# Patient Record
Sex: MALE | Race: WHITE | URBAN - METROPOLITAN AREA
[De-identification: names, ages, dates, MRNs, and addresses within clinical notes are randomized per-mention and may not be internally consistent; named-entity substitution may affect disease eponyms.]

---

## 2019-01-01 ENCOUNTER — OFFICE VISIT (OUTPATIENT)
Dept: NEUROLOGY | Age: 79
End: 2019-01-01

## 2019-01-01 VITALS
BODY MASS INDEX: 22.66 KG/M2 | WEIGHT: 153 LBS | HEART RATE: 60 BPM | DIASTOLIC BLOOD PRESSURE: 55 MMHG | OXYGEN SATURATION: 98 % | SYSTOLIC BLOOD PRESSURE: 98 MMHG | HEIGHT: 69 IN

## 2019-01-01 DIAGNOSIS — I10 BENIGN ESSENTIAL HTN: ICD-10-CM

## 2019-01-01 DIAGNOSIS — I63.9 CEREBROVASCULAR ACCIDENT (CVA), UNSPECIFIED MECHANISM (HCC): ICD-10-CM

## 2019-01-01 DIAGNOSIS — G43.009 MIGRAINE WITHOUT AURA AND WITHOUT STATUS MIGRAINOSUS, NOT INTRACTABLE: Primary | ICD-10-CM

## 2019-01-01 DIAGNOSIS — E78.5 HYPERLIPIDEMIA, UNSPECIFIED HYPERLIPIDEMIA TYPE: ICD-10-CM

## 2019-01-01 RX ORDER — ALPRAZOLAM 0.5 MG/1
0.5 TABLET ORAL
COMMUNITY

## 2019-01-01 RX ORDER — SUCRALFATE 1 G/10ML
SUSPENSION ORAL 4 TIMES DAILY
COMMUNITY

## 2019-01-01 RX ORDER — PYRIDOXINE HCL (VITAMIN B6) 25 MG
25 TABLET ORAL DAILY
COMMUNITY

## 2019-01-01 RX ORDER — ASCORBIC ACID 500 MG
TABLET ORAL
COMMUNITY

## 2019-01-01 RX ORDER — LATANOPROST 50 UG/ML
1 SOLUTION/ DROPS OPHTHALMIC
COMMUNITY

## 2019-01-01 RX ORDER — CLOPIDOGREL BISULFATE 75 MG/1
TABLET ORAL
COMMUNITY

## 2019-01-01 RX ORDER — SPIRONOLACTONE 25 MG/1
TABLET ORAL DAILY
COMMUNITY

## 2019-01-01 RX ORDER — POTASSIUM CHLORIDE 1.5 G/1.77G
20 POWDER, FOR SOLUTION ORAL 2 TIMES DAILY WITH MEALS
COMMUNITY

## 2019-01-01 RX ORDER — LISINOPRIL 5 MG/1
TABLET ORAL DAILY
COMMUNITY

## 2019-01-01 RX ORDER — ESCITALOPRAM OXALATE 10 MG/1
10 TABLET ORAL DAILY
COMMUNITY

## 2019-01-01 RX ORDER — ATORVASTATIN CALCIUM 40 MG/1
TABLET, FILM COATED ORAL DAILY
COMMUNITY

## 2019-01-01 RX ORDER — BUMETANIDE 1 MG/1
TABLET ORAL DAILY
COMMUNITY

## 2019-01-01 RX ORDER — SUMATRIPTAN 25 MG/1
TABLET, FILM COATED ORAL
Qty: 9 TAB | Refills: 2 | Status: SHIPPED | OUTPATIENT
Start: 2019-01-01

## 2019-01-01 RX ORDER — FOLIC ACID 1 MG/1
TABLET ORAL DAILY
COMMUNITY

## 2019-01-01 RX ORDER — DIVALPROEX SODIUM 250 MG/1
250 TABLET, DELAYED RELEASE ORAL
Qty: 30 TAB | Refills: 2 | Status: SHIPPED | OUTPATIENT
Start: 2019-01-01

## 2019-01-01 RX ORDER — ASPIRIN 81 MG/1
TABLET ORAL DAILY
COMMUNITY

## 2019-09-24 NOTE — LETTER
9/24/19 Patient: Cristina Brar YOB: 1940 Date of Visit: 9/24/2019 [address]by [address] [place] [address] VIA Facsimile: [phone] Dear Mahesh Obando MD, Thank you for referring Mr. Randy Melendez to [address] [place] [address] for evaluation. My notes for this consultation are attached. If you have questions, please do not hesitate to call me. I look forward to following your patient along with you. Sincerely, Kerry Mcghee MD

## 2019-09-24 NOTE — PATIENT INSTRUCTIONS

## 2019-09-24 NOTE — PROGRESS NOTES
Neurology Note    Chief Complaint   Patient presents with    New Patient     cva memory       HPI/Subjective  Vinay Ita ODEN is a 70 y.o. male who presented to the neurology office for management of stroke. In June 2019, he was confused and dizzy and he did have CT head and he did have a subacute right occipital infarct. The memory problem has improved some. He takes aspirin 81 mg a day and plavix 75 a day. He does also have headaches 1/week. It is in the right occipital area. It is 8/10 and it is a dull ache in nature. There are not associated with photophobia, phonophobia, nausea and vomiting. 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 Current Outpatient Medications   Medication Sig    aspirin delayed-release 81 mg tablet Take  by mouth daily.  bumetanide (BUMEX) 1 mg tablet Take  by mouth daily.  multivitamin, tx-iron-ca-min (THERA-M W/ IRON) 9 mg iron-400 mcg tab tablet Take 1 Tab by mouth daily.  clopidogrel (PLAVIX) 75 mg tab Take  by mouth.  pyridoxine, vitamin B6, (VITAMIN B-6) 25 mg tablet Take 25 mg by mouth daily.  escitalopram oxalate (LEXAPRO) 10 mg tablet Take 10 mg by mouth daily.  folic acid (FOLVITE) 1 mg tablet Take  by mouth daily.  lisinopril (PRINIVIL, ZESTRIL) 5 mg tablet Take  by mouth daily.  spironolactone (ALDACTONE) 25 mg tablet Take  by mouth daily.  ascorbic acid, vitamin C, (VITAMIN C) 500 mg tablet Take  by mouth.  ALPRAZolam (XANAX) 0.5 mg tablet Take 0.5 mg by mouth nightly as needed for Anxiety.  atorvastatin (LIPITOR) 40 mg tablet Take  by mouth daily.  sucralfate (CARAFATE) 100 mg/mL suspension Take  by mouth four (4) times daily.  latanoprost (XALATAN) 0.005 % ophthalmic solution Administer 1 Drop to both eyes nightly.  potassium chloride (KLOR-CON) 20 mEq pack Take 20 mEq by mouth two (2) times daily (with meals).     SUMAtriptan (IMITREX) 25 mg tablet 1 tab PO at onset of headache, can repeat X 1 in 2 hrs if HA persists. Not more than 10 days/month.  divalproex DR (DEPAKOTE) 250 mg tablet Take 1 Tab by mouth nightly. No current facility-administered medications for this visit. Allergies not on file  Past Medical History:   Diagnosis Date    Cerebral artery occlusion with cerebral infarction [facility])     Essential hypertension     Heart disease      No past surgical history on file. No family history on file. Social History     Tobacco Use    Smoking status: Not on file   Substance Use Topics    Alcohol use: Not on file    Drug use: Not on file       REVIEW OF SYSTEMS:   A ten system review of constitutional, cardiovascular, respiratory, musculoskeletal, endocrine, skin, SHEENT, genitourinary, psychiatric and neurologic systems was obtained and is unremarkable with the exception of dizziness    EXAMINATION:   There were no vitals taken for this visit. General:   General appearance: Pt is in no acute distress   Distal pulses are preserved    Neurological Examination:   Mental Status: AAO x3. Speech is fluent. Follows commands, has normal fund of knowledge, attention, short term recall, comprehension and insight. Cranial Nerves: Visual fields are full. PERRL, Extraocular movements are full. Facial sensation intact. Facial movement intact. Hearing intact to conversation. Palate elevates symmetrically. Shoulder shrug symmetric. Tongue midline. Motor: Strength is 5/5 in all 4 ext. No atrophy. Tone: Normal    Sensation: Light touch - Normal    Reflexes: DTRs 2+ throughout. Coordination/Cerebellar: Intact to finger-nose-finger     Gait: Casual gait is normal.     Skin: No significant bruising or lacerations. Laboratory review:   No results found for this or any previous visit. Imaging review:  6/26/2019  CT scan of the head without contrast  No acute intracranial hemorrhage. Subacute to chronic appearing right occipital lobe infarct.   Small chronic appearing left occipital infarct. Nonspecific punctate scattered intracranial calcification. 1/11/2019  LDL 65    Documentation review:  None    Assessment/Plan:   Lyn Gutierrez III is a 70 y.o. male who presented to the neurology office for management of stroke. The patient has had 2 CT scans in the recent one did show a right occipital infarction. Patient is taking aspirin 81 mg a day and Plavix 75 mg daily. We will continue the same. Other risk factors include hyperlipidemia the patient is taking Lipitor. Patient is also have left carotid occlusion. I am going to get the records from the primary care physician and the cardiologist.  If there is left carotid occlusion, nothing can be done about it. The patient is also having migraines which is almost every day. We will start the patient on Imitrex as needed and Depakote 250 mg daily. Follow-up in 3 months    No flowsheet data found. Primary care to address possible depression if PHQ-9 score is more than 9. ICD-10-CM ICD-9-CM    1. Migraine without aura and without status migrainosus, not intractable G43.009 346.10 SUMAtriptan (IMITREX) 25 mg tablet      divalproex DR (DEPAKOTE) 250 mg tablet   2. Cerebrovascular accident (CVA), unspecified mechanism ([facility] [address] I63.9 434.91    3. Hyperlipidemia, unspecified hyperlipidemia type E78.5 272.4    4. Benign essential HTN I10 401.1       Thank you for allowing me to participate in the care of Mr. Julia Felton. Please feel free to contact me if you have any questions. Debra Riggins MD  Neurologist    CC: Pam Charlton MD  Fax: [phone]    This note was created using voice recognition software. Despite editing, there may be syntax errors.